# Patient Record
(demographics unavailable — no encounter records)

---

## 2024-10-10 NOTE — REVIEW OF SYSTEMS
[Nl] : Endocrine [Change in Vision] : change in vision [Frequent URIs] : frequent upper respiratory infections [Rhinorrhea] : rhinorrhea [Nasal Congestion] : nasal congestion [Shortness of Breath] : shortness of breath [Reflux] : reflux [Rash] : rash [Urticaria] : urticaria [Allergy Shiners] : allergy shiners [Eye Discharge] : no eye discharge [Redness] : no redness [Snoring] : no snoring [Apnea] : no apnea [Restlessness] : no restlessness [Night Walking] : no night walking [Daytime Sleepiness] : no daytime sleepiness [Daytime Hyperactivity] : no daytime hyperactivity [Voice Changes] : no voice changes [Frequent Croup] : no frequent croup [Chronic Hoarseness] : no chronic hoarseness [Sinus Problems] : no sinus problems [Postnasl Drip] : no postnasal drip [Epistaxis] : no epistaxis [Tinnitus] : no tinnitus [Recurrent Sinus Infections] : no recurrent sinus infections [Recurrent Throat Infections] : no recurrent throat infections [Tachypnea] : not tachypneic [Wheezing] : no wheezing [Cough] : no cough [Bronchitis] : no bronchitis [Pneumonia] : no pneumonia [Hemoptysis] : no hemoptysis [Sputum] : no sputum [Chest Tightness] : no chest tightness [Pleuritic Pain] : no pleuritic pain [Chronically Infected with ___] : no chronic infections [Spitting Up] : not spitting up [Problems Swallowing] : no problems swallowing [Abdominal Pain] : no abdominal pain [Diarrhea] : no diarrhea [Constipation] : no constipation [Foul Smelling Stool] : no foul smelling stool [Oily Stool] : no oily stool [Heartburn] : no heartburn [Nausea] : no nausea [Vomiting] : no vomiting [Food Intolerance] : food tolerant [Abdomen Distention] : abdomen not distended [Rectal Prolapse] : no rectal prolapse [Urgency] : no feelings of urinary urgency [Dysuria] : no dysuria [Birth Marks] : no birth marks [Eczema] : no ezcema [Skin Infections] : no skin infections [Laryngeal Edema] : no laryngeal edema [Immunocompromised] : not immunocompromised [Angioedema] : no angioedema [FreeTextEntry4] : Recurrent emergency room visits and hospitalizations. [FreeTextEntry7] : Transaminitis [de-identified] : History of irregular menstruation.POCS

## 2024-10-10 NOTE — ASSESSMENT
[FreeTextEntry1] : Impression: Severe persistent bronchial asthma, allergic rhinitis, vitamin D deficiency, obesity with elevated triglycerides and transaminitis, history of ovarian cysts, PCOS, gastroesophageal reflux   Severe persistent bronchial asthma:   Results of exhaled nitric oxide testing discussed.  Concerned that compliance is suboptimal.  Encouraged her to take her medications altogether and set a timer.  Advair was prescribed and decreased to 250/50 1 puff twice daily and montelukast continued, 10 mg daily.  Incruse Ellipta was continued 1 puff daily.    Albuterol with a spacer and mouthpiece is to be used prior to activity and every 4 hours as needed.  Allergic rhinitis: Environmental allergen control measures have been suggested.  Fluticasone was prescribed, 2 puffs each nostril in the morning as needed and cetirizine as needed. Eosinophils are increased at 13%.  Vitamin D deficiency:   She is to take vitamin D3, 2000 international units daily..  She is obese with elevated triglycerides and transaminitis: Food choices were discussed.  Stressed the importance of decreasing caloric intake and increasing activity level. Gastroesophageal reflux disease: She is taking famotidine as needed.  Encouraged her to decrease reflux triggers in her diet and avoiding eating for 2 hours before bedtime. Visit took 40 minutes. Over 50% of time was spent in counseling.  I asked her to return for a follow-up visit in 2 months.  .  Dictation generated through [employer] [facility]. Note not proofed and edited.

## 2024-10-10 NOTE — CONSULT LETTER
[Dear  ___] : Dear  [unfilled], [Consult Letter:] : I had the pleasure of evaluating your patient, [unfilled]. [Please see my note below.] : Please see my note below. [Consult Closing:] : Thank you very much for allowing me to participate in the care of this patient.  If you have any questions, please do not hesitate to contact me. [Sincerely,] : Sincerely, [FreeTextEntry3] : Mary Otto MD Pediatric Pulmonology and Sleep Medicine Director Pediatric Asthma Center , Pediatric Sleep Disorders,  of Pediatrics, [facility] at [facility], [address] [place] [id number] (P)[phone] (P) [phone] (F) [phone]

## 2024-10-10 NOTE — PHYSICAL EXAM
[Well Nourished] : well nourished [Well Developed] : well developed [Alert] : ~L alert [Active] : active [No Drainage] : no drainage [No Conjunctivitis] : no conjunctivitis [Tympanic Membranes Clear] : tympanic membranes were clear [No Polyps] : no polyps [No Sinus Tenderness] : no sinus tenderness [No Oral Pallor] : no oral pallor [No Oral Cyanosis] : no oral cyanosis [No Exudates] : no exudates [No Postnasal Drip] : no postnasal drip [Tonsil Size ___] : tonsil size [unfilled] [No Tonsillar Enlargement] : no tonsillar enlargement [No Stridor] : no stridor [Absence Of Retractions] : absence of retractions [Symmetric] : symmetric [Good Expansion] : good expansion [No Acc Muscle Use] : no accessory muscle use [Good aeration to bases] : good aeration to bases [Equal Breath Sounds] : equal breath sounds bilaterally [No Crackles] : no crackles [No Rhonchi] : no rhonchi [No Wheezing] : no wheezing [Normal Sinus Rhythm] : normal sinus rhythm [No Heart Murmur] : no heart murmur [Soft, Non-Tender] : soft, non-tender [No Hepatosplenomegaly] : no hepatosplenomegaly [Non Distended] : was not ~L distended [Abdomen Mass (___ Cm)] : no abdominal mass palpated [Abdomen Hernia] : no hernia was discovered [Full ROM] : full range of motion [No Clubbing] : no clubbing [Capillary Refill < 2 secs] : capillary refill less than two seconds [No Cyanosis] : no cyanosis [No Petechiae] : no petechiae [No Kyphoscoliosis] : no kyphoscoliosis [No Contractures] : no contractures [Abnormal Walk] : normal gait [Alert and  Oriented] : alert and oriented [No Abnormal Focal Findings] : no abnormal focal findings [Normal Muscle Tone And Reflexes] : normal muscle tone and reflexes [No Birth Marks] : no birth marks [No Skin Ulcers] : no skin ulcers [No Nasal Drainage] : no nasal drainage [FreeTextEntry1] : Obese. Weight increased since last seen. [FreeTextEntry2] : Allergic shiners, glasses [FreeTextEntry4] : Nasal mucous membranes barry [de-identified] : Acne face

## 2024-10-10 NOTE — HISTORY OF PRESENT ILLNESS
[FreeTextEntry1] : This 20-year-old was seen for a follow-up visit.  She had been noncompliant with routine medications and had been hospitalized in the intensive care unit for a day in September 2024.  She stated that since discharge she had been more compliant.  She does not cough at night.  She takes albuterol prior to activity and tolerates activity well.  She is occasionally nasally congested. She started Xolair at the end of May 2024.  She had had an emergency room visit for shortness of breath May 2024 shortly before starting Xolair shots.  Her routine medications are Advair 250/50, 1 puff twice daily, Incruse Ellipta once daily and montelukast.  She had been discharged from the hospital on Advair 500 which she was taking.  When seen July 2024, as exhaled nitric oxide was elevated I questioned compliance with routine medications.  She stated that she had not been able to get her routine medications for 2 weeks and just restarted them the day prior to this visit. She drinks limited amounts of milk but usually forgets to take vitamin D3 supplements.  Her menstrual cycle is now regular and she is taking oral birth control.  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 . She was refluxing and takes famotidine as needed.  She is not decreasing reflux triggers in her diet.  .She states that she is not coughing at night. She took a course of ergocalciferol for her vitamin D deficiency.. Her weight increased slightly since last seen.   Her 25-hydroxy vitamin D level was less than 6 NG per mL.  She drinks limited amounts of milk.  I had seen her when she was hospitalized in the pediatric intensive care unit February 2024.  She was hospitalized with shortness of breath, cough and congestion.  She had been seen prior to admission in the emergency room once and discharged on steroids and DuoNebs but had to return to the emergency room.  She was complaining of chest tightness at the time of admission.  Mother administered nebulized albuterol and called the paramedics who brought her to the emergency room.  Hospitalizations: February 2024.She was hospitalized again in April 2024.  Hospitalized September 2024 for an asthma exacerbation.  Emergency room visits: Prior to the hospitalization and at the time of hospitalizations.  She had been seen with asthma exacerbations October 2023 and December 2023.Seen in May 2024 with shortness of breath.  She had received steroids 7 times in the past year.  She was diagnosed to have asthma at 3 years of age.  She would develop colds associated with coughing, wheezing and shortness of breath once or twice a year. Eosinophils 13% February 2024.  From October 2023 she had had increased symptoms.  Montelukast had been prescribed but she had only started taking it immediately prior to the initial hospitalization.  Wixela had been prescribed but her technique was poor.  She has a runny nose in the spring.  She has a history of coughing and wheezing at night and with activity.  She follows up with allergy and immunology.  Allergy testing positive to cat dander, dog dander, mold and dust mites.  She was diagnosed to have POCS at 16 years of age.  She has previously been diagnosed to have gastroesophageal reflux disease. Was following up with endocrinology. : She presented to Dr. Gore at 16 year of age with c/c of irregular periods with periods coming Q3-5 months. Work up was done which showed elevated free testosterone in absence of other pathology pointing to PCOS as the likely diagnosis. In addition, the pelvic US showed two right paraovarian cysts with repeat US showing resolution of one of the cysts and the second cyst stable in size.  Patient was placed on OCPs- Junel (Norethindrone Acet-Ethinyl Est 1.5-30 mg-mcg) daily in 05/2020 (patient is sexually active so using OCPs for contraception as well) which were switched to Sprintec at visit in 01/2021 due to persistent menstrual irregularity.   -Switched the OCPs (From Junel to Sprintec) - started Sprintec January 24, 2021. Since then ,periods became regular; lasting 5-7 days. Does not miss any doses.    -No show for RD appointment with Nohemy Justin in 02/2021-   -  -BW done on 01/21/2021 Fasting ([facility]) singiificant for High Triglycerides of 141, elevated insulin to 28 and impaired fasting glucose of 100 while HgA1C is still normal at 5.2%, TFTs are normal, free testosterone level (previously elevated) improved to normal which is what is seen in a girl with PCOS on regular OCPs. LH is suppresed as expected on OCPs.  -BW was done by PMD on 04/19/2021 - fasting  TC elevated to 180, CMP: BG 78, ALT slighlty elevated to 34 (5-32), normal CBCd, UA done during her menstrual cycles shows RBCs, blood as expected     States that gets occasional abdominal pain/chest discomfort when eating acid foods- has been diagnosed with GERD  02/27/2020-Done by Dr. Gore prior to initating OCPs  CMP: BG 79, no transaminitis, normal renal panel.  TSH 0.90 (0.5-4.30), fT4 1.0 (0.8-1.4)  LH 8.71 (0.97-14.7), FSH 5.1  Total Testo 35 (<40) , Free testosterone 7.3 (<3.6 pg/ml)-elevated , Androstenodione 162 () , DHEAS 194 ()  17OHP- 40 ()  Prolactin 13.4 (3.2-20)  HCG <2 mIU/L  Anti-mullerian Hormone 9.54    01/21/2021 -Fasting (done while on OCPs)  Lipid Panel: , HDL 42, -high  CMP:  (impaired fasting glucose), K 3.7 (3.8-5.1)-slightly low  HgA1C 5.2%  Insulin 28 (<19.6)-elevated  Total Testosterone: 18 (40), free Testosterone 1.2 (0.5-3.9)  Estradiol 15 pg/mL  normal TSH and fT4  CBCd normal except slighty elevated eosinophils (expected as patient has asthma  LH 0.84 -suppressed as expeted in a girl on OCPs  FSH 3.22    04/19/2021 [facility] Fasting  CMP: BG 78, normal AST but ALT 34 (5-32)-elevated  TSH 0.77 (0.50-4.30)  CBCd; normal  UA-dirty smaple -patient was on her period      Review of imaging  01/20/2020  Pelvic US:  Uterus: 5.9X4.4X4.5 cm with normal echogenecity and morphology. 0.7 cm endometrial stripe  Right ovary 4.1X2.1X2.5 (calculated ovarian volume=11.25 ml) with two paraovarian cysts  Left ovary: 2.5X2X1.5cm (calculate ovarian volume-7.5ml)    11/16/2020  Pelvic US:  Uterus: 7.1X3.2X4.5 cm, endometrium measure 5 mm  Right ovary 3.7X1.8X2.5 (volume 8.4ml), single paraovarian cyst seen that is stable in appearance compared to prior study  Left ovary: 2.4X2.2X1.9 (volume 5.3 ml)   .

## 2024-10-10 NOTE — SOCIAL HISTORY
[Mother] : mother [Sister] : sister [None] : none [FreeTextEntry1] : Works at [employer] [Smokers in Household] : there are no smokers in the home

## 2024-12-10 NOTE — HISTORY OF PRESENT ILLNESS
[FreeTextEntry1] : This 21-year-old was seen for a follow-up visit.   She stated that she had been more compliant with routine medications.  She was taking Advair 250/50, 1 puff twice daily, Incruse Ellipta once daily and montelukast.  She drinks limited amounts of milk but takes vitamin D3 supplements.  She works at an office.  She had had a stuffy nose of a few day'ss duration.  She stated that she had had labs checked which showed that she was prediabetic with insulin resistance.  Her PCP had suggested Wegovy.  She is waiting for insurance approval.  She takes an OCP and has regular menstrual cycles.  When she is well, she does not cough at night.  She takes albuterol with a spacer prior to activity.  She had not had any sick visits since last seen.  She refluxes and takes famotidine as needed. When she had been noncompliant with routine medications se had been hospitalized in the intensive care unit for a day in September 2024.  She stated that since discharge she had been more compliant.   She started Xolair at the end of May 2024.  She had had an emergency room visit for shortness of breath May 2024 shortly before starting Xolair shots.   When seen July 2024, as exhaled nitric oxide was elevated I questioned compliance with routine medications.  She stated that she had not been able to get her routine medications for 2 weeks and just restarted them the day prior to this visit.   Her menstrual cycle is now regular and she is taking oral birth control.   She is not decreasing reflux triggers in her diet.  . She took a course of ergocalciferol for her vitamin D deficiency.. Her weight decreased slightly since last seen.   Initially her 25-hydroxy vitamin D level was less than 6 NG per mL.  She drinks limited amounts of milk.  I had seen her when she was hospitalized in the pediatric intensive care unit February 2024.  She was hospitalized with shortness of breath, cough and congestion.  She had been seen prior to admission in the emergency room once and discharged on steroids and DuoNebs but had to return to the emergency room.  She was complaining of chest tightness at the time of admission.  Mother administered nebulized albuterol and called the paramedics who brought her to the emergency room.  Hospitalizations: February 2024.She was hospitalized again in April 2024.  Hospitalized September 2024 for an asthma exacerbation.  Emergency room visits: Prior to the hospitalization and at the time of hospitalizations.  She had been seen with asthma exacerbations October 2023 and December 2023.Seen in May 2024 with shortness of breath.  She had received steroids 7 times in the past year.  She was diagnosed to have asthma at 3 years of age.  She would develop colds associated with coughing, wheezing and shortness of breath once or twice a year. Eosinophils 13% February 2024.  From October 2023 she had had increased symptoms.  Montelukast had been prescribed but she had only started taking it immediately prior to the initial hospitalization.  Wixela had been prescribed but her technique was poor.  She has a runny nose in the spring.  She has a history of coughing and wheezing at night and with activity.  She follows up with allergy and immunology.  Allergy testing positive to cat dander, dog dander, mold and dust mites.  She was diagnosed to have POCS at 16 years of age.  She had previously been diagnosed to have gastroesophageal reflux disease. Was following up with endocrinology. : She presented to Dr. Gore at 16 year of age with c/c of irregular periods with periods coming Q3-5 months. Work up was done which showed elevated free testosterone in absence of other pathology pointing to PCOS as the likely diagnosis. In addition, the pelvic US showed two right paraovarian cysts with repeat US showing resolution of one of the cysts and the second cyst stable in size.  Patient was placed on OCPs- Junel (Norethindrone Acet-Ethinyl Est 1.5-30 mg-mcg) daily in 05/2020 (patient is sexually active so using OCPs for contraception as well) .   -Switched the OCPs (From Junel to Sprintec) - started Sprintec January 24, 2021. Since then ,periods became regular; lasting 5-7 days. Does not miss any doses. At present she takes another OCP.   -No show for RD appointment with Nohemy Justin in 02/2021-   -  -BW done on 01/21/2021 F[facility] ([facility]) singiificant for High Triglycerides of 141, elevated insulin to 28 and impaired fasting glucose of 100 while HgA1C was still normal at 5.2%, TFTs are normal, free testosterone level (previously elevated) improved to normal which is what is seen in a girl with PCOS on regular OCPs. LH is suppresed as expected on OCPs.  -BW was done by PMD on 04/19/2021 - fasting  TC elevated to 180, CMP: BG 78, ALT slighlty elevated to 34 (5-32), normal CBCd, UA done during her menstrual cycles shows RBCs, blood as expected     States that gets occasional abdominal pain/chest discomfort when eating acid foods- has been diagnosed with GERD  02/27/2020-Done by Dr. Gore prior to initating OCPs  CMP: BG 79, no transaminitis, normal renal panel.  TSH 0.90 (0.5-4.30), fT4 1.0 (0.8-1.4)  LH 8.71 (0.97-14.7), FSH 5.1  Total Testo 35 (<40) , Free testosterone 7.3 (<3.6 pg/ml)-elevated , Androstenodione 162 () , DHEAS 194 ()  17OHP- 40 ()  Prolactin 13.4 (3.2-20)  HCG <2 mIU/L  Anti-mullerian Hormone 9.54    01/21/2021 -Fasting (done while on OCPs)  Lipid Panel: , HDL 42, -high  CMP:  (impaired fasting glucose), K 3.7 (3.8-5.1)-slightly low  HgA1C 5.2%  Insulin 28 (<19.6)-elevated  Total Testosterone: 18 (40), free Testosterone 1.2 (0.5-3.9)  Estradiol 15 pg/mL  normal TSH and fT4  CBCd normal except slighty elevated eosinophils (expected as patient has asthma  LH 0.84 -suppressed as expeted in a girl on OCPs  FSH 3.22    04/19/2021 [facility] Fasting  CMP: BG 78, normal AST but ALT 34 (5-32)-elevated  TSH 0.77 (0.50-4.30)  CBCd; normal  UA-dirty smaple -patient was on her period      Review of imaging  01/20/2020  Pelvic US:  Uterus: 5.9X4.4X4.5 cm with normal echogenecity and morphology. 0.7 cm endometrial stripe  Right ovary 4.1X2.1X2.5 (calculated ovarian volume=11.25 ml) with two paraovarian cysts  Left ovary: 2.5X2X1.5cm (calculate ovarian volume-7.5ml)    11/16/2020  Pelvic US:  Uterus: 7.1X3.2X4.5 cm, endometrium measure 5 mm  Right ovary 3.7X1.8X2.5 (volume 8.4ml), single paraovarian cyst seen that is stable in appearance compared to prior study  Left ovary: 2.4X2.2X1.9 (volume 5.3 ml)   .

## 2024-12-10 NOTE — CONSULT LETTER
[Dear  ___] : Dear  [unfilled], [Consult Letter:] : I had the pleasure of evaluating your patient, [unfilled]. [Please see my note below.] : Please see my note below. [Consult Closing:] : Thank you very much for allowing me to participate in the care of this patient.  If you have any questions, please do not hesitate to contact me. [Sincerely,] : Sincerely, [FreeTextEntry3] : Mary Otto MD Pediatric Pulmonology and Sleep Medicine Director Pediatric Asthma Center , Pediatric Sleep Disorders,  of Pediatrics, [facility] at [facility], [address] [place] [id number] (P)[phone] (P) [phone] (F) [phone]

## 2024-12-10 NOTE — ASSESSMENT
[FreeTextEntry1] : Impression: Severe persistent bronchial asthma, allergic rhinitis, vitamin D deficiency, obesity with elevated triglycerides and transaminitis, history of ovarian cysts, PCOS, gastroesophageal reflux   Severe persistent bronchial asthma:    Encouraged her to take her medications altogether and set a timer.  Advair was prescribed  250/50 1 puff twice daily and montelukast continued, 10 mg daily.  Incruse Ellipta was continued 1 puff daily.    Albuterol with a spacer and mouthpiece is to be used prior to activity and every 4 hours as needed. Xolair was administered.  Mother declined the influenza vaccine.  Allergic rhinitis: Environmental allergen control measures have been suggested.  Fluticasone was prescribed, 2 puffs each nostril in the morning as needed and cetirizine as needed. Eosinophils are increased at 13%.  Vitamin D deficiency:   She is to take vitamin D3, 2000 international units daily..  She is obese with elevated triglycerides and transaminitis: Food choices were discussed.  Stressed the importance of decreasing caloric intake and increasing activity level.Her PCP has diagnosed prediabetes and has suggested taking Wegovy.  I offered to refer her to endocrinology and provided nutritionist referral. Gastroesophageal reflux disease: She is taking famotidine as needed.  Encouraged her to decrease reflux triggers in her diet and avoiding eating for 2 hours before bedtime. Visit took 40 minutes. Over 50% of time was spent in counseling.  I asked her to return for a follow-up visit in 2 months.  .  Dictation generated through [employer] [facility]. Note not proofed and edited.

## 2024-12-10 NOTE — REVIEW OF SYSTEMS
[Nl] : Endocrine [Change in Vision] : change in vision [Frequent URIs] : frequent upper respiratory infections [Rhinorrhea] : rhinorrhea [Nasal Congestion] : nasal congestion [Reflux] : reflux [Rash] : rash [Urticaria] : urticaria [Allergy Shiners] : allergy shiners [Eye Discharge] : no eye discharge [Redness] : no redness [Snoring] : no snoring [Apnea] : no apnea [Restlessness] : no restlessness [Night Walking] : no night walking [Daytime Sleepiness] : no daytime sleepiness [Daytime Hyperactivity] : no daytime hyperactivity [Voice Changes] : no voice changes [Frequent Croup] : no frequent croup [Chronic Hoarseness] : no chronic hoarseness [Sinus Problems] : no sinus problems [Postnasl Drip] : no postnasal drip [Epistaxis] : no epistaxis [Tinnitus] : no tinnitus [Recurrent Sinus Infections] : no recurrent sinus infections [Recurrent Throat Infections] : no recurrent throat infections [Tachypnea] : not tachypneic [Wheezing] : no wheezing [Cough] : no cough [Shortness of Breath] : no shortness of breath [Bronchitis] : no bronchitis [Pneumonia] : no pneumonia [Hemoptysis] : no hemoptysis [Sputum] : no sputum [Chest Tightness] : no chest tightness [Pleuritic Pain] : no pleuritic pain [Chronically Infected with ___] : no chronic infections [Spitting Up] : not spitting up [Problems Swallowing] : no problems swallowing [Abdominal Pain] : no abdominal pain [Diarrhea] : no diarrhea [Constipation] : no constipation [Foul Smelling Stool] : no foul smelling stool [Oily Stool] : no oily stool [Heartburn] : no heartburn [Nausea] : no nausea [Vomiting] : no vomiting [Food Intolerance] : food tolerant [Abdomen Distention] : abdomen not distended [Rectal Prolapse] : no rectal prolapse [Urgency] : no feelings of urinary urgency [Dysuria] : no dysuria [Birth Marks] : no birth marks [Eczema] : no ezcema [Skin Infections] : no skin infections [Laryngeal Edema] : no laryngeal edema [Immunocompromised] : not immunocompromised [Angioedema] : no angioedema [FreeTextEntry4] : Recurrent emergency room visits and hospitalizations. [FreeTextEntry7] : Transaminitis [de-identified] : History of irregular menstruation.POCS

## 2024-12-10 NOTE — ASSESSMENT
[FreeTextEntry1] : Impression: Severe persistent bronchial asthma, allergic rhinitis, vitamin D deficiency, obesity with elevated triglycerides and transaminitis, history of ovarian cysts, PCOS, gastroesophageal reflux   Severe persistent bronchial asthma:    Encouraged her to take her medications altogether and set a timer.  Advair was prescribed  250/50 1 puff twice daily and montelukast continued, 10 mg daily.  Incruse Ellipta was continued 1 puff daily.    Albuterol with a spacer and mouthpiece is to be used prior to activity and every 4 hours as needed. Xolair was administered.  Mother declined the influenza vaccine.  Allergic rhinitis: Environmental allergen control measures have been suggested.  Fluticasone was prescribed, 2 puffs each nostril in the morning as needed and cetirizine as needed. Eosinophils are increased at 13%.  Vitamin D deficiency:   She is to take vitamin D3, 2000 international units daily..  She is obese with elevated triglycerides and transaminitis: Food choices were discussed.  Stressed the importance of decreasing caloric intake and increasing activity level.Her PCP has diagnosed prediabetes and has suggested taking Wegovy.  I offered to refer her to endocrinology and provided nutritionist referral. Gastroesophageal reflux disease: She is taking famotidine as needed.  Encouraged her to decrease reflux triggers in her diet and avoiding eating for 2 hours before bedtime. Visit took 40 minutes. Over 50% of time was spent in counseling.  I asked her to return for a follow-up visit in 2 months.  .  Dictation generated through [facility] [facility]. Note not proofed and edited.

## 2024-12-10 NOTE — PHYSICAL EXAM
[Well Nourished] : well nourished [Well Developed] : well developed [Alert] : ~L alert [Active] : active [No Drainage] : no drainage [No Conjunctivitis] : no conjunctivitis [Tympanic Membranes Clear] : tympanic membranes were clear [No Polyps] : no polyps [No Sinus Tenderness] : no sinus tenderness [No Oral Pallor] : no oral pallor [No Oral Cyanosis] : no oral cyanosis [No Exudates] : no exudates [No Postnasal Drip] : no postnasal drip [Tonsil Size ___] : tonsil size [unfilled] [No Tonsillar Enlargement] : no tonsillar enlargement [No Stridor] : no stridor [Absence Of Retractions] : absence of retractions [Symmetric] : symmetric [Good Expansion] : good expansion [No Acc Muscle Use] : no accessory muscle use [Good aeration to bases] : good aeration to bases [Equal Breath Sounds] : equal breath sounds bilaterally [No Crackles] : no crackles [No Rhonchi] : no rhonchi [No Wheezing] : no wheezing [Normal Sinus Rhythm] : normal sinus rhythm [No Heart Murmur] : no heart murmur [Soft, Non-Tender] : soft, non-tender [No Hepatosplenomegaly] : no hepatosplenomegaly [Non Distended] : was not ~L distended [Abdomen Mass (___ Cm)] : no abdominal mass palpated [Abdomen Hernia] : no hernia was discovered [Full ROM] : full range of motion [No Clubbing] : no clubbing [Capillary Refill < 2 secs] : capillary refill less than two seconds [No Cyanosis] : no cyanosis [No Petechiae] : no petechiae [No Kyphoscoliosis] : no kyphoscoliosis [No Contractures] : no contractures [Abnormal Walk] : normal gait [Alert and  Oriented] : alert and oriented [No Abnormal Focal Findings] : no abnormal focal findings [Normal Muscle Tone And Reflexes] : normal muscle tone and reflexes [No Birth Marks] : no birth marks [No Skin Ulcers] : no skin ulcers [FreeTextEntry1] : Obese. Weight decreased slightly since last seen. [FreeTextEntry2] : Allergic shiners, glasses [FreeTextEntry4] : Nasally congested [de-identified] : Acne face

## 2024-12-10 NOTE — CONSULT LETTER
[Dear  ___] : Dear  [unfilled], [Consult Letter:] : I had the pleasure of evaluating your patient, [unfilled]. [Please see my note below.] : Please see my note below. [Consult Closing:] : Thank you very much for allowing me to participate in the care of this patient.  If you have any questions, please do not hesitate to contact me. [Sincerely,] : Sincerely, [FreeTextEntry3] : Mary Otto MD Pediatric Pulmonology and Sleep Medicine Director [facility] , Pediatric Sleep Disorders,  of Pediatrics, [facility] at [facility], [address] [place] [id number] (P)[phone] (P) [phone] (F) [phone]

## 2024-12-10 NOTE — PHYSICAL EXAM
[Well Nourished] : well nourished [Well Developed] : well developed [Alert] : ~L alert [Active] : active [No Drainage] : no drainage [No Conjunctivitis] : no conjunctivitis [Tympanic Membranes Clear] : tympanic membranes were clear [No Polyps] : no polyps [No Sinus Tenderness] : no sinus tenderness [No Oral Pallor] : no oral pallor [No Oral Cyanosis] : no oral cyanosis [No Exudates] : no exudates [No Postnasal Drip] : no postnasal drip [Tonsil Size ___] : tonsil size [unfilled] [No Tonsillar Enlargement] : no tonsillar enlargement [No Stridor] : no stridor [Absence Of Retractions] : absence of retractions [Symmetric] : symmetric [Good Expansion] : good expansion [No Acc Muscle Use] : no accessory muscle use [Good aeration to bases] : good aeration to bases [Equal Breath Sounds] : equal breath sounds bilaterally [No Crackles] : no crackles [No Rhonchi] : no rhonchi [No Wheezing] : no wheezing [Normal Sinus Rhythm] : normal sinus rhythm [No Heart Murmur] : no heart murmur [Soft, Non-Tender] : soft, non-tender [No Hepatosplenomegaly] : no hepatosplenomegaly [Non Distended] : was not ~L distended [Abdomen Mass (___ Cm)] : no abdominal mass palpated [Abdomen Hernia] : no hernia was discovered [Full ROM] : full range of motion [No Clubbing] : no clubbing [Capillary Refill < 2 secs] : capillary refill less than two seconds [No Cyanosis] : no cyanosis [No Petechiae] : no petechiae [No Kyphoscoliosis] : no kyphoscoliosis [No Contractures] : no contractures [Abnormal Walk] : normal gait [Alert and  Oriented] : alert and oriented [No Abnormal Focal Findings] : no abnormal focal findings [Normal Muscle Tone And Reflexes] : normal muscle tone and reflexes [No Birth Marks] : no birth marks [No Skin Ulcers] : no skin ulcers [FreeTextEntry1] : Obese. Weight decreased slightly since last seen. [FreeTextEntry2] : Allergic shiners, glasses [FreeTextEntry4] : Nasally congested [de-identified] : Acne face

## 2024-12-10 NOTE — REVIEW OF SYSTEMS
[Nl] : Endocrine [Change in Vision] : change in vision [Frequent URIs] : frequent upper respiratory infections [Rhinorrhea] : rhinorrhea [Nasal Congestion] : nasal congestion [Reflux] : reflux [Rash] : rash [Urticaria] : urticaria [Allergy Shiners] : allergy shiners [Eye Discharge] : no eye discharge [Redness] : no redness [Snoring] : no snoring [Apnea] : no apnea [Restlessness] : no restlessness [Night Walking] : no night walking [Daytime Sleepiness] : no daytime sleepiness [Daytime Hyperactivity] : no daytime hyperactivity [Voice Changes] : no voice changes [Frequent Croup] : no frequent croup [Chronic Hoarseness] : no chronic hoarseness [Sinus Problems] : no sinus problems [Postnasl Drip] : no postnasal drip [Epistaxis] : no epistaxis [Tinnitus] : no tinnitus [Recurrent Sinus Infections] : no recurrent sinus infections [Recurrent Throat Infections] : no recurrent throat infections [Tachypnea] : not tachypneic [Wheezing] : no wheezing [Cough] : no cough [Shortness of Breath] : no shortness of breath [Bronchitis] : no bronchitis [Pneumonia] : no pneumonia [Hemoptysis] : no hemoptysis [Sputum] : no sputum [Chest Tightness] : no chest tightness [Pleuritic Pain] : no pleuritic pain [Chronically Infected with ___] : no chronic infections [Spitting Up] : not spitting up [Problems Swallowing] : no problems swallowing [Abdominal Pain] : no abdominal pain [Diarrhea] : no diarrhea [Constipation] : no constipation [Foul Smelling Stool] : no foul smelling stool [Oily Stool] : no oily stool [Heartburn] : no heartburn [Nausea] : no nausea [Vomiting] : no vomiting [Food Intolerance] : food tolerant [Abdomen Distention] : abdomen not distended [Rectal Prolapse] : no rectal prolapse [Urgency] : no feelings of urinary urgency [Dysuria] : no dysuria [Birth Marks] : no birth marks [Eczema] : no ezcema [Skin Infections] : no skin infections [Laryngeal Edema] : no laryngeal edema [Immunocompromised] : not immunocompromised [Angioedema] : no angioedema [FreeTextEntry4] : Recurrent emergency room visits and hospitalizations. [FreeTextEntry7] : Transaminitis [de-identified] : History of irregular menstruation.POCS

## 2025-03-19 NOTE — ASSESSMENT
[FreeTextEntry1] :  patient can receive Xolair today observe post injection per protocol before d/c from office continue previous asthma care plan call or contact us if there is any concern 911 if symptoms of anaphylaxis family understands

## 2025-03-19 NOTE — HISTORY OF PRESENT ILLNESS
[FreeTextEntry1] : Patient was followed for xolair for severe persistent asthma The symptoms are  well controlled : Patient is by report          compliant with controller RX  No hospitalization, emergency department, urgent care, unscheduled PMD visit for asthma, no systemic steroid, no absence from school// parents take leave because of pt asthma.  symptoms are          controlled by [employer]'s

## 2025-04-22 NOTE — PHYSICAL EXAM
[Well Nourished] : well nourished [Well Developed] : well developed [Alert] : ~L alert [Active] : active [No Drainage] : no drainage [No Conjunctivitis] : no conjunctivitis [Tympanic Membranes Clear] : tympanic membranes were clear [No Polyps] : no polyps [No Sinus Tenderness] : no sinus tenderness [No Oral Pallor] : no oral pallor [No Oral Cyanosis] : no oral cyanosis [No Exudates] : no exudates [No Postnasal Drip] : no postnasal drip [Tonsil Size ___] : tonsil size [unfilled] [No Tonsillar Enlargement] : no tonsillar enlargement [No Stridor] : no stridor [Absence Of Retractions] : absence of retractions [Symmetric] : symmetric [Good Expansion] : good expansion [No Acc Muscle Use] : no accessory muscle use [Good aeration to bases] : good aeration to bases [Equal Breath Sounds] : equal breath sounds bilaterally [No Crackles] : no crackles [No Rhonchi] : no rhonchi [No Wheezing] : no wheezing [Normal Sinus Rhythm] : normal sinus rhythm [No Heart Murmur] : no heart murmur [Soft, Non-Tender] : soft, non-tender [No Hepatosplenomegaly] : no hepatosplenomegaly [Non Distended] : was not ~L distended [Abdomen Mass (___ Cm)] : no abdominal mass palpated [Abdomen Hernia] : no hernia was discovered [Full ROM] : full range of motion [No Clubbing] : no clubbing [Capillary Refill < 2 secs] : capillary refill less than two seconds [No Cyanosis] : no cyanosis [No Petechiae] : no petechiae [No Kyphoscoliosis] : no kyphoscoliosis [No Contractures] : no contractures [Abnormal Walk] : normal gait [Alert and  Oriented] : alert and oriented [No Abnormal Focal Findings] : no abnormal focal findings [Normal Muscle Tone And Reflexes] : normal muscle tone and reflexes [No Birth Marks] : no birth marks [No Skin Ulcers] : no skin ulcers [FreeTextEntry1] : Obese. Weight decreased 3KG since last seen. [FreeTextEntry2] : Allergic shiners, glasses [FreeTextEntry4] : Nasally congested [de-identified] : Acne face

## 2025-04-22 NOTE — REVIEW OF SYSTEMS
[Nl] : Endocrine [Change in Vision] : change in vision [Rhinorrhea] : rhinorrhea [Nasal Congestion] : nasal congestion [Reflux] : reflux [Rash] : rash [Urticaria] : urticaria [Allergy Shiners] : allergy shiners [Eye Discharge] : no eye discharge [Redness] : no redness [Frequent URIs] : no frequent upper respiratory infections [Snoring] : no snoring [Apnea] : no apnea [Restlessness] : no restlessness [Night Walking] : no night walking [Daytime Sleepiness] : no daytime sleepiness [Daytime Hyperactivity] : no daytime hyperactivity [Voice Changes] : no voice changes [Frequent Croup] : no frequent croup [Chronic Hoarseness] : no chronic hoarseness [Sinus Problems] : no sinus problems [Postnasl Drip] : no postnasal drip [Epistaxis] : no epistaxis [Tinnitus] : no tinnitus [Recurrent Ear Infections] : no recurrent ear infections [Recurrent Sinus Infections] : no recurrent sinus infections [Recurrent Throat Infections] : no recurrent throat infections [Tachypnea] : not tachypneic [Wheezing] : no wheezing [Cough] : cough [Shortness of Breath] : shortness of breath [Bronchitis] : no bronchitis [Pneumonia] : no pneumonia [Hemoptysis] : no hemoptysis [Sputum] : no sputum [Chest Tightness] : no chest tightness [Pleuritic Pain] : no pleuritic pain [Chronically Infected with ___] : no chronic infections [Spitting Up] : not spitting up [Problems Swallowing] : no problems swallowing [Abdominal Pain] : no abdominal pain [Diarrhea] : no diarrhea [Constipation] : no constipation [Foul Smelling Stool] : no foul smelling stool [Oily Stool] : no oily stool [Heartburn] : no heartburn [Nausea] : no nausea [Vomiting] : no vomiting [Food Intolerance] : food tolerant [Abdomen Distention] : abdomen not distended [Rectal Prolapse] : no rectal prolapse [Urgency] : no feelings of urinary urgency [Dysuria] : no dysuria [Birth Marks] : no birth marks [Eczema] : no ezcema [Skin Infections] : no skin infections [Laryngeal Edema] : no laryngeal edema [Immunocompromised] : not immunocompromised [Angioedema] : no angioedema [FreeTextEntry4] : Recurrent emergency room visits and hospitalizations. [FreeTextEntry7] : Transaminitis [de-identified] : History of irregular menstruation.POCS

## 2025-04-22 NOTE — PHYSICAL EXAM
[Well Nourished] : well nourished [Well Developed] : well developed [Alert] : ~L alert [Active] : active [No Drainage] : no drainage [No Conjunctivitis] : no conjunctivitis [Tympanic Membranes Clear] : tympanic membranes were clear [No Polyps] : no polyps [No Sinus Tenderness] : no sinus tenderness [No Oral Pallor] : no oral pallor [No Oral Cyanosis] : no oral cyanosis [No Exudates] : no exudates [No Postnasal Drip] : no postnasal drip [Tonsil Size ___] : tonsil size [unfilled] [No Tonsillar Enlargement] : no tonsillar enlargement [No Stridor] : no stridor [Absence Of Retractions] : absence of retractions [Symmetric] : symmetric [Good Expansion] : good expansion [No Acc Muscle Use] : no accessory muscle use [Good aeration to bases] : good aeration to bases [Equal Breath Sounds] : equal breath sounds bilaterally [No Crackles] : no crackles [No Rhonchi] : no rhonchi [No Wheezing] : no wheezing [Normal Sinus Rhythm] : normal sinus rhythm [No Heart Murmur] : no heart murmur [Soft, Non-Tender] : soft, non-tender [No Hepatosplenomegaly] : no hepatosplenomegaly [Non Distended] : was not ~L distended [Abdomen Mass (___ Cm)] : no abdominal mass palpated [Abdomen Hernia] : no hernia was discovered [Full ROM] : full range of motion [No Clubbing] : no clubbing [Capillary Refill < 2 secs] : capillary refill less than two seconds [No Cyanosis] : no cyanosis [No Petechiae] : no petechiae [No Kyphoscoliosis] : no kyphoscoliosis [No Contractures] : no contractures [Abnormal Walk] : normal gait [Alert and  Oriented] : alert and oriented [No Abnormal Focal Findings] : no abnormal focal findings [Normal Muscle Tone And Reflexes] : normal muscle tone and reflexes [No Birth Marks] : no birth marks [No Skin Ulcers] : no skin ulcers [FreeTextEntry1] : Obese. Weight decreased 3KG since last seen. [FreeTextEntry2] : Allergic shiners, glasses [FreeTextEntry4] : Nasally congested [de-identified] : Acne face

## 2025-04-22 NOTE — ASSESSMENT
[FreeTextEntry1] : Impression: Severe persistent bronchial asthma, allergic rhinitis, vitamin D deficiency, obesity with elevated triglycerides and transaminitis, history of ovarian cysts, PCOS, gastroesophageal reflux   Severe persistent bronchial asthma:    Encouraged her to take her medications altogether and set a timer.  Advair was prescribed  250/50 1 puff twice daily and montelukast continued, 10 mg daily.  Incruse Ellipta was continued 1 puff daily.    Albuterol with a spacer and mouthpiece is to be used prior to activity and every 4 hours as needed. Xolair was administered.  Suggested using the action plan at the time of this visit. Allergic rhinitis: Environmental allergen control measures have been suggested.  Fluticasone was prescribed, 2 puffs each nostril in the morning as needed and cetirizine as needed. Eosinophils are increased at 13%.  Vitamin D deficiency:   Encouraged her to take vitamin D3 consistently, 2000 international units of D3  She is obese with elevated triglycerides and transaminitis: Food choices were discussed.  Stressed the importance of decreasing caloric intake and increasing activity level.Her PCP has diagnosed prediabetes and has suggested taking Wegovy.  I offered to refer her to endocrinology and provided nutritionist referral. Gastroesophageal reflux disease: She is taking famotidine as needed.  Encouraged her to decrease reflux triggers in her diet and avoiding eating for 2 hours before bedtime. Visit took 40 minutes. Over 50% of time was spent in counseling.  I asked her to return for a follow-up visit in 3 months.  . Encouraged her to transition to an adult pulmonologist in the next few months. Dictation generated through [employer] [facility]. Note not proofed and edited.

## 2025-04-22 NOTE — IMPRESSION
[Spirometry] : Spirometry [Mild] : (mild) [FreeTextEntry1] : Spirometry with mild obstructive lung disease with an FEV1 by FVC of 85% and FEF 25 to 75% of 90% predicted.  Exhaled nitric oxide markedly elevated at 143

## 2025-04-22 NOTE — CONSULT LETTER
[Dear  ___] : Dear  [unfilled], [Consult Letter:] : I had the pleasure of evaluating your patient, [unfilled]. [Please see my note below.] : Please see my note below. [Consult Closing:] : Thank you very much for allowing me to participate in the care of this patient.  If you have any questions, please do not hesitate to contact me. [Sincerely,] : Sincerely, [FreeTextEntry3] : Mary Otto MD Pediatric Pulmonology and Sleep Medicine Director Pediatric Asthma Center , Pediatric Sleep Disorders,  of Pediatrics, [facility] at [facility], [address] [place] [id number] (P)[phone] (P) [phone] (F) [phone]

## 2025-04-22 NOTE — REVIEW OF SYSTEMS
[Nl] : Endocrine [Change in Vision] : change in vision [Rhinorrhea] : rhinorrhea [Nasal Congestion] : nasal congestion [Reflux] : reflux [Rash] : rash [Urticaria] : urticaria [Allergy Shiners] : allergy shiners [Eye Discharge] : no eye discharge [Redness] : no redness [Frequent URIs] : no frequent upper respiratory infections [Snoring] : no snoring [Apnea] : no apnea [Restlessness] : no restlessness [Night Walking] : no night walking [Daytime Sleepiness] : no daytime sleepiness [Daytime Hyperactivity] : no daytime hyperactivity [Voice Changes] : no voice changes [Frequent Croup] : no frequent croup [Chronic Hoarseness] : no chronic hoarseness [Sinus Problems] : no sinus problems [Postnasl Drip] : no postnasal drip [Epistaxis] : no epistaxis [Tinnitus] : no tinnitus [Recurrent Ear Infections] : no recurrent ear infections [Recurrent Sinus Infections] : no recurrent sinus infections [Recurrent Throat Infections] : no recurrent throat infections [Tachypnea] : not tachypneic [Wheezing] : no wheezing [Cough] : cough [Shortness of Breath] : shortness of breath [Bronchitis] : no bronchitis [Pneumonia] : no pneumonia [Hemoptysis] : no hemoptysis [Sputum] : no sputum [Chest Tightness] : no chest tightness [Pleuritic Pain] : no pleuritic pain [Chronically Infected with ___] : no chronic infections [Spitting Up] : not spitting up [Problems Swallowing] : no problems swallowing [Abdominal Pain] : no abdominal pain [Diarrhea] : no diarrhea [Constipation] : no constipation [Foul Smelling Stool] : no foul smelling stool [Oily Stool] : no oily stool [Heartburn] : no heartburn [Nausea] : no nausea [Vomiting] : no vomiting [Food Intolerance] : food tolerant [Abdomen Distention] : abdomen not distended [Rectal Prolapse] : no rectal prolapse [Urgency] : no feelings of urinary urgency [Dysuria] : no dysuria [Birth Marks] : no birth marks [Eczema] : no ezcema [Skin Infections] : no skin infections [Laryngeal Edema] : no laryngeal edema [Immunocompromised] : not immunocompromised [Angioedema] : no angioedema [FreeTextEntry4] : Recurrent emergency room visits and hospitalizations. [FreeTextEntry7] : Transaminitis [de-identified] : History of irregular menstruation.POCS

## 2025-04-22 NOTE — HISTORY OF PRESENT ILLNESS
[FreeTextEntry1] : This 21-year-old was seen for a follow-up visit.   Routine medications Advair 250/50, 1 puff twice daily, Incruse Ellipta 1 puff daily and montelukast.  She had not been taking montelukast consistently.  She had not been coughing at night till a few days prior to this visit when she developed cough .  She had increased nasal congestion over weeks duration.  She had been seen in the emergency room 2 days earlier and was treated with steroids.  She drinks limited amounts of milk but only occasionally takes vitamin D3 supplements.  She works at an office.  She stated that she had had labs checked which showed that she was prediabetic with insulin resistance.  Her PCP had suggested Wegovy.    She takes an OCP and has regular menstrual cycles.  When she is well, she does not cough at night.  She takes albuterol with a spacer prior to activity.  She refluxes and takes famotidine as needed. When she had been noncompliant with routine medications se had been hospitalized in the intensive care unit for a day in September 2024.   She started Xolair at the end of May 2024.  She had had an emergency room visit for shortness of breath May 2024 shortly before starting Xolair shots. Emergency room visit April 2025 for an asthma exacerbation.  When seen July 2024, as exhaled nitric oxide was elevated I questioned compliance with routine medications.  She stated that she had not been able to get her routine medications for 2 weeks and just restarted them the day prior to this visit.   Her menstrual cycle is now regular and she is taking oral birth control.   She is  decreasing reflux triggers in her diet.  . She took a course of ergocalciferol for her vitamin D deficiency.. Her weight had increased 3 kg since last seen.   Initially her 25-hydroxy vitamin D level was less than 6 NG per mL.   I had seen her when she was hospitalized in the pediatric intensive care unit February 2024.  She was hospitalized with shortness of breath, cough and congestion.  She had been seen prior to admission in the emergency room once and discharged on steroids and DuoNebs but had to return to the emergency room.  She was complaining of chest tightness at the time of admission.  Mother administered nebulized albuterol and called the paramedics who brought her to the emergency room.  Hospitalizations: February 2024.She was hospitalized again in April 2024.  Hospitalized September 2024 for an asthma exacerbation.  Emergency room visits: Prior to the hospitalization and at the time of hospitalizations.  She had been seen with asthma exacerbations October 2023 and December 2023.Seen in May 2024 with shortness of breath.Seen April 2025 with cough and shortness of breath.  She had received steroids 8 times in the past year.  She was diagnosed to have asthma at 3 years of age.  She would develop colds associated with coughing, wheezing and shortness of breath once or twice a year. Eosinophils 13% February 2024.  From October 2023 she had had increased symptoms.  Montelukast had been prescribed but she had only started taking it immediately prior to the initial hospitalization.  Wixela had been prescribed but her technique was poor.  She has a runny nose in the spring.  She has a history of coughing and wheezing at night and with activity.  She follows up with allergy and immunology.  Allergy testing positive to cat dander, dog dander, mold and dust mites.  She was diagnosed to have POCS at 16 years of age.  She had previously been diagnosed to have gastroesophageal reflux disease. Was following up with endocrinology. : She presented to Dr. Gore at 16 year of age with c/c of irregular periods with periods coming Q3-5 months. Work up was done which showed elevated free testosterone in absence of other pathology pointing to PCOS as the likely diagnosis. In addition, the pelvic US showed two right paraovarian cysts with repeat US showing resolution of one of the cysts and the second cyst stable in size.  Patient was placed on OCPs- Junel (Norethindrone Acet-Ethinyl Est 1.5-30 mg-mcg) daily in 05/2020 (patient is sexually active so using OCPs for contraception as well) .   -Switched the OCPs (From Junel to Sprintec) - started Sprintec January 24, 2021. Since then ,periods became regular; lasting 5-7 days. Does not miss any doses. At present she takes another OCP.   -No show for RD appointment with Nohemy Justin in 02/2021-   -  -BW done on 01/21/2021 Fasting ([facility]) singiificant for High Triglycerides of 141, elevated insulin to 28 and impaired fasting glucose of 100 while HgA1C was still normal at 5.2%, TFTs are normal, free testosterone level (previously elevated) improved to normal which is what is seen in a girl with PCOS on regular OCPs. LH is suppresed as expected on OCPs.  -BW was done by PMD on 04/19/2021 - fasting  TC elevated to 180, CMP: BG 78, ALT slighlty elevated to 34 (5-32), normal CBCd, UA done during her menstrual cycles shows RBCs, blood as expected     States that gets occasional abdominal pain/chest discomfort when eating acid foods- has been diagnosed with GERD  02/27/2020-Done by Dr. Gore prior to initating OCPs  CMP: BG 79, no transaminitis, normal renal panel.  TSH 0.90 (0.5-4.30), fT4 1.0 (0.8-1.4)  LH 8.71 (0.97-14.7), FSH 5.1  Total Testo 35 (<40) , Free testosterone 7.3 (<3.6 pg/ml)-elevated , Androstenodione 162 () , DHEAS 194 ()  17OHP- 40 ()  Prolactin 13.4 (3.2-20)  HCG <2 mIU/L  Anti-mullerian Hormone 9.54    01/21/2021 -Fasting (done while on OCPs)  Lipid Panel: , HDL 42, -high  CMP:  (impaired fasting glucose), K 3.7 (3.8-5.1)-slightly low  HgA1C 5.2%  Insulin 28 (<19.6)-elevated  Total Testosterone: 18 (40), free Testosterone 1.2 (0.5-3.9)  Estradiol 15 pg/mL  normal TSH and fT4  CBCd normal except slighty elevated eosinophils (expected as patient has asthma  LH 0.84 -suppressed as expeted in a girl on OCPs  FSH 3.22    04/19/2021 [facility] Fasting  CMP: BG 78, normal AST but ALT 34 (5-32)-elevated  TSH 0.77 (0.50-4.30)  CBCd; normal  UA-dirty smaple -patient was on her period      Review of imaging  01/20/2020  Pelvic US:  Uterus: 5.9X4.4X4.5 cm with normal echogenecity and morphology. 0.7 cm endometrial stripe  Right ovary 4.1X2.1X2.5 (calculated ovarian volume=11.25 ml) with two paraovarian cysts  Left ovary: 2.5X2X1.5cm (calculate ovarian volume-7.5ml)    11/16/2020  Pelvic US:  Uterus: 7.1X3.2X4.5 cm, endometrium measure 5 mm  Right ovary 3.7X1.8X2.5 (volume 8.4ml), single paraovarian cyst seen that is stable in appearance compared to prior study  Left ovary: 2.4X2.2X1.9 (volume 5.3 ml)   .

## 2025-04-22 NOTE — CONSULT LETTER
[Dear  ___] : Dear  [unfilled], [Consult Letter:] : I had the pleasure of evaluating your patient, [unfilled]. [Please see my note below.] : Please see my note below. [Consult Closing:] : Thank you very much for allowing me to participate in the care of this patient.  If you have any questions, please do not hesitate to contact me. [Sincerely,] : Sincerely, [FreeTextEntry3] : Mary Otto MD Pediatric Pulmonology and Sleep Medicine Director Pediatric Asthma Center , Pediatric Sleep Disorders,  of Pediatrics, [facility] at [facility], [address] [place] [id number] (P)[phone] (P) [phone] (F) [phone]

## 2025-04-22 NOTE — ASSESSMENT
[FreeTextEntry1] : Impression: Severe persistent bronchial asthma, allergic rhinitis, vitamin D deficiency, obesity with elevated triglycerides and transaminitis, history of ovarian cysts, PCOS, gastroesophageal reflux   Severe persistent bronchial asthma:    Encouraged her to take her medications altogether and set a timer.  Advair was prescribed  250/50 1 puff twice daily and montelukast continued, 10 mg daily.  Incruse Ellipta was continued 1 puff daily.    Albuterol with a spacer and mouthpiece is to be used prior to activity and every 4 hours as needed. Xolair was administered.  Suggested using the action plan at the time of this visit. Allergic rhinitis: Environmental allergen control measures have been suggested.  Fluticasone was prescribed, 2 puffs each nostril in the morning as needed and cetirizine as needed. Eosinophils are increased at 13%.  Vitamin D deficiency:   Encouraged her to take vitamin D3 consistently, 2000 international units of D3  She is obese with elevated triglycerides and transaminitis: Food choices were discussed.  Stressed the importance of decreasing caloric intake and increasing activity level.Her PCP has diagnosed prediabetes and has suggested taking Wegovy.  I offered to refer her to endocrinology and provided nutritionist referral. Gastroesophageal reflux disease: She is taking famotidine as needed.  Encouraged her to decrease reflux triggers in her diet and avoiding eating for 2 hours before bedtime. Visit took 40 minutes. Over 50% of time was spent in counseling.  I asked her to return for a follow-up visit in 3 months.  . Encouraged her to transition to an adult pulmonologist in the next few months. Dictation generated through [facility] [facility]. Note not proofed and edited.